# Patient Record
(demographics unavailable — no encounter records)

---

## 2024-10-10 NOTE — CONSULT LETTER
[FreeTextEntry2] : Sally White MD [FreeTextEntry1] : Dear Sally  Shahnaz presents for her first postoperative visit 2 weeks status post left tympanoplasty.  She has been doing well since surgery without significant pain or drainage.  Exam today shows an intact left tympanic membrane reconstruction after packing removal and healed postauricular incision.  Bilateral facial nerve function is normal.  Mejia is midline and Rinne test shows air conduction greater than bone conduction left ear.  So far she appears to be healing well after surgery.  I asked that she continue drops for another few days, maintain dry ear precautions, and we will see her back in 1 month.    Thanks again for your referral and I will continue to keep you updated.  Sincerely,  Fer Palumbo MD Otology/Neurotology [facility]

## 2024-10-10 NOTE — HISTORY OF PRESENT ILLNESS
[de-identified] : 12 y/o F presents with mother for first post operative visit s/p left tympanoplasty 9/24 patient reports that she is doing well since surgery denies any issues with pain or drainage

## 2024-10-10 NOTE — ASSESSMENT
[FreeTextEntry1] : Shahnaz presents for her first postoperative visit 2 weeks status post left tympanoplasty.  She has been doing well since surgery without significant pain or drainage.  Exam today shows an intact left tympanic membrane reconstruction after packing removal and healed postauricular incision.  Bilateral facial nerve function is normal.  Mejai is midline and Rinne test shows air conduction greater than bone conduction left ear.  So far she appears to be healing well after surgery.  I asked that she continue drops for another few days, maintain dry ear precautions, and we will see her back in 1 month.

## 2025-02-05 NOTE — ASSESSMENT
[FreeTextEntry1] : Shahnaz Blunt presents for postoperative visit 4 months status post left tympanoplasty.  Her exam today shows well-healed left tympanic membrane reconstruction, and postoperative audiogram shows improved hearing with normal tympanogram in the left ear.  She and her mom are pleased with her hearing improvement.  She no longer needs to maintain dry ear precautions.  I would like to see her back once more in 6 months to confirm no recurrence of ETD.

## 2025-02-05 NOTE — CONSULT LETTER
[FreeTextEntry2] : Sally White MD [FreeTextEntry1] : Dear Shahnaz Zavala presents for postoperative visit 4 months status post left tympanoplasty.  Her exam today shows well-healed left tympanic membrane reconstruction, and postoperative audiogram shows improved hearing with normal tympanogram in the left ear.  She and her mom are pleased with her hearing improvement.  She no longer needs to maintain dry ear precautions.  I would like to see her back once more in 6 months to confirm no recurrence of ETD.  Thanks again for your referral.  Sincerely,  Fer Palumbo MD Otology/Neurotology [facility]

## 2025-02-05 NOTE — PROCEDURE
[FreeTextEntry3] : Procedure note:  Right cerumenectomy  Feb 05, 2025   Description of Procedure:  Cerumen impaction was noted requiring debridement under the operating microscope using otologic instrumentation.  The patient tolerated the procedure without complications.

## 2025-02-05 NOTE — REASON FOR VISIT
[Subsequent Evaluation] : a subsequent evaluation for [Mother] : mother [FreeTextEntry2] : s/p left tympanoplasty 9/24